# Patient Record
(demographics unavailable — no encounter records)

---

## 2025-07-24 NOTE — PHYSICAL EXAM
[Abdomen Tenderness] : ~T No ~M abdominal tenderness [JVD] : no jugular venous distention  [Normal Breath Sounds] : Normal breath sounds [No Rash or Lesion] : No rash or lesion [Alert] : alert [Calm] : calm [de-identified] : Well appearing male in NAD [de-identified] : MMM [de-identified] : ROM WNL [FreeTextEntry1] : The pt was examined in the prone issac-knife position with a medical assistant present for the entirety of the examination. Visual examination of the anal verge with effacement of the buttocks revealed a posterior scar without active fissure otherwise no masses, ulcerations, or skin rashes. Digital rectal exam revealed no palpable mass or blood with sphincter tone within normal limits. A lubricated anoscope was then inserted revealing healthy appearing distal rectal mucosa and anoderm with three appropriately sized, noninflamed internal hemorrhoids.  The patient tolerated the exam well.

## 2025-07-24 NOTE — HISTORY OF PRESENT ILLNESS
[FreeTextEntry1] : 62M presenting with rectal pain during intercourse with reocurring fissuring with anal penetration. BMs are regular, formed, soft and at times bulked without issue. With each encounter of anal penetration he has recurring posterior fissuring that then heals within a week with supportive care using a barrier ointment and stool softeners. 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    PMH: HIV (well controlled), CKD, Scleroderma, Osteoarthritis Meds: Biktarvy, Propecia, Cialis, Amlodipine, Rosuvastatin All: NKDA PSH: R hip surgery, Anal condyloma excision/fulguration ([place]) FH: Denies CRC/IBD Cscope: 2019 WNL

## 2025-07-24 NOTE — PLAN
[TextEntry] : - Recommended safe sexual practices to limit fissuring to include ample lubrication and consideration for dilation prior to penetration. - Limit hydrocortisone use.